# Patient Record
Sex: FEMALE | HISPANIC OR LATINO | Employment: FULL TIME | URBAN - METROPOLITAN AREA
[De-identification: names, ages, dates, MRNs, and addresses within clinical notes are randomized per-mention and may not be internally consistent; named-entity substitution may affect disease eponyms.]

---

## 2018-04-23 ENCOUNTER — OFFICE VISIT (OUTPATIENT)
Dept: URGENT CARE | Facility: CLINIC | Age: 38
End: 2018-04-23

## 2018-04-23 VITALS
HEART RATE: 80 BPM | SYSTOLIC BLOOD PRESSURE: 98 MMHG | BODY MASS INDEX: 21.6 KG/M2 | TEMPERATURE: 99 F | OXYGEN SATURATION: 99 % | DIASTOLIC BLOOD PRESSURE: 68 MMHG | HEIGHT: 60 IN | RESPIRATION RATE: 18 BRPM | WEIGHT: 110 LBS

## 2018-04-23 DIAGNOSIS — L03.129: Primary | ICD-10-CM

## 2018-04-23 PROCEDURE — 99203 OFFICE O/P NEW LOW 30 MIN: CPT | Mod: S$GLB,,, | Performed by: PHYSICIAN ASSISTANT

## 2018-04-23 RX ORDER — CLINDAMYCIN PHOSPHATE 150 MG/ML
600 INJECTION, SOLUTION INTRAVENOUS
Status: COMPLETED | OUTPATIENT
Start: 2018-04-23 | End: 2018-04-23

## 2018-04-23 RX ORDER — CLINDAMYCIN HYDROCHLORIDE 300 MG/1
300 CAPSULE ORAL 3 TIMES DAILY
Qty: 21 CAPSULE | Refills: 0 | Status: SHIPPED | OUTPATIENT
Start: 2018-04-23 | End: 2018-04-30

## 2018-04-23 RX ADMIN — CLINDAMYCIN PHOSPHATE 600 MG: 150 INJECTION, SOLUTION INTRAVENOUS at 02:04

## 2018-04-23 NOTE — LETTER
April 23, 2018      [facility]  [place] [address]  Phone: [phone]  Fax: [phone]       Patient: Millie Toure   YOB: 1980  Date of Visit: 04/23/2018    To Whom It May Concern:    Janie Toure  was at [facility] on 04/23/2018. She may return to work/school on 4/24/2018 with no restrictions. If you have any questions or concerns, or if I can be of further assistance, please do not hesitate to contact me.    Sincerely,    Karol Bang PA-C

## 2018-04-23 NOTE — PATIENT INSTRUCTIONS
Lymphangitis  The lymphatic system is a part of the immune system and helps to fight against infection and inflammation. It consists of lymph glands (lymph nodes) throughout the body. Lymph channels carry lymph fluid from parts of the body to nearby lymph glands to be filtered.  Lymphangitis is an inflammation of lymph channels. It is caused by infected lymph fluid traveling from a site of infection. Symptoms are tender red streaks on the skin near the area of infection. This condition is commonly called blood poisoning, but the blood is not involved.  The condition can become very serious if not treated.The infection is treated with antibiotics or other medicines that treat the infection. If an abscess is present, it may be drained.When the site of infection is treated, the lymphangitis goes away.  Home care  · Take all medicine to treat the infection exactly as prescribed until it is gone. Be very careful not to miss any doses. Do not stop taking the medicine until it is finished or your healthcare provider tells you to stop, even if you feel better.  · Follow your healthcare provider's instructions for taking other medicines. Ask your healthcare provider before taking any over-the-counter medicines.  · Make a warm compress by running hot water over a face cloth. Apply it to the sore area until the compress cools off. Repeat 3 times a day for the first 3 days. The heat will increase the blood flow to the area and speed the healing process. As an alternative, you can  the shower and direct the warm spray to the area.  Follow-up care  Follow up with your healthcare provider, or as directed by our staff.  When to seek medical advice  Call your healthcare provider if any of the following occur:  · Increasing areas of redness or pain at the site of infection  · Red streaks continue to grow  · Pus or fluid draining from the lymph node  · Fever of 100.4°F (38°C) or higher, or as directed by your healthcare  provider.  Date Last Reviewed: 9/25/2015  © 9857-6018 [employer]. [address] [place] [address], [place] [address]. All rights reserved. This information is not intended as a substitute for professional medical care. Always follow your healthcare professional's instructions.      Please follow up with your Primary care provider within 2-5 days if your signs and symptoms have not resolved or worsen.     If your condition worsens or fails to improve we recommend that you receive another evaluation at the emergency room immediately or contact your primary medical clinic to discuss your concerns.   You must understand that you have received an Urgent Care treatment only and that you may be released before all of your medical problems are known or treated. You, the patient, will arrange for follow up care as instructed.

## 2018-04-23 NOTE — PROGRESS NOTES
Subjective:       Patient ID: Millie Toure is a 37 y.o. female.    Vitals:  height is 5' (1.524 m) and weight is 49.9 kg (110 lb). Her temperature is 99.1 °F (37.3 °C). Her blood pressure is 98/68 and her pulse is 80. Her respiration is 18 and oxygen saturation is 99%.     Chief Complaint: Leg Pain    Patient states that she fell on [place] 04/15/2018 injuring both of her knees. Patient is not having any pain in her knees but believes the leg pain is associated with her fall.      Leg Pain    The incident occurred 3 to 5 days ago. There was no injury mechanism. The pain is present in the right leg. Quality: pulsating  The pain is at a severity of 7/10. The pain is moderate. The pain has been constant since onset. Pertinent negatives include no inability to bear weight, loss of motion, loss of sensation, muscle weakness, numbness or tingling. She reports no foreign bodies present. Nothing aggravates the symptoms. Treatments tried: Ibuprofen  The treatment provided significant relief.     Review of Systems   Constitution: Negative for chills, fever and weakness.   HENT: Negative for sore throat.    Eyes: Negative for blurred vision.   Cardiovascular: Positive for leg swelling. Negative for chest pain.   Respiratory: Negative for shortness of breath.    Skin: Positive for rash.   Musculoskeletal: Negative for back pain and joint pain.   Gastrointestinal: Negative for abdominal pain, diarrhea, nausea and vomiting.   Neurological: Negative for headaches, numbness and tingling.   Psychiatric/Behavioral: The patient is not nervous/anxious.        Objective:      Physical Exam   Constitutional: She is oriented to person, place, and time. She appears well-developed and well-nourished. She is cooperative.  Non-toxic appearance. She does not appear ill. No distress.   HENT:   Head: Normocephalic and atraumatic.   Right Ear: Hearing, tympanic membrane, external ear and ear canal normal.   Left Ear: Hearing, tympanic membrane,  external ear and ear canal normal.   Nose: Nose normal. No mucosal edema, rhinorrhea or nasal deformity. No epistaxis. Right sinus exhibits no maxillary sinus tenderness and no frontal sinus tenderness. Left sinus exhibits no maxillary sinus tenderness and no frontal sinus tenderness.   Mouth/Throat: Uvula is midline, oropharynx is clear and moist and mucous membranes are normal. No trismus in the jaw. Normal dentition. No uvula swelling. No posterior oropharyngeal erythema.   Eyes: Conjunctivae and lids are normal. Right eye exhibits no discharge. Left eye exhibits no discharge. No scleral icterus.   Sclera clear bilat   Neck: Trachea normal, normal range of motion, full passive range of motion without pain and phonation normal. Neck supple.   Cardiovascular: Normal rate, regular rhythm, normal heart sounds, intact distal pulses and normal pulses.    Pulmonary/Chest: Effort normal and breath sounds normal. No respiratory distress.   Abdominal: Soft. Normal appearance and bowel sounds are normal. She exhibits no distension, no pulsatile midline mass and no mass. There is no tenderness.   Musculoskeletal: Normal range of motion. She exhibits no edema or deformity.        Right hip: She exhibits normal range of motion, normal strength, no tenderness, no bony tenderness, no swelling, no crepitus, no deformity and no laceration.        Right knee: She exhibits normal range of motion, no swelling, no effusion, no ecchymosis, no deformity, no laceration, no erythema, normal alignment, no LCL laxity, normal patellar mobility, no bony tenderness, normal meniscus and no MCL laxity. Tenderness found. No medial joint line, no lateral joint line, no MCL, no LCL and no patellar tendon tenderness noted.        Right ankle: She exhibits normal range of motion, no swelling, no ecchymosis, no deformity, no laceration and normal pulse. No tenderness. Achilles tendon normal.        Legs:  Abrasion noted anteriorly with erythematous  streaking spreading proximally and Right inguinal lymphadenopathy   Lymphadenopathy:        Right: Inguinal adenopathy present.   Neurological: She is alert and oriented to person, place, and time. She exhibits normal muscle tone. Coordination normal.   Skin: Skin is warm, dry and intact. She is not diaphoretic. No pallor.   Psychiatric: She has a normal mood and affect. Her speech is normal and behavior is normal. Judgment and thought content normal. Cognition and memory are normal.   Nursing note and vitals reviewed.      Assessment:       1. Acute lymphangitis of leg, except foot        Plan:         Acute lymphangitis of leg, except foot  -     clindamycin injection 600 mg; Inject 4 mLs (600 mg total) into the muscle one time.  -     clindamycin (CLEOCIN) 300 MG capsule; Take 1 capsule (300 mg total) by mouth 3 (three) times daily.  Dispense: 21 capsule; Refill: 0    Red flags/warnings signs were discussed with patient that would warrant emergent medical attention. All patients questions answered. They verbalized understanding to the plan.       Lymphangitis  The lymphatic system is a part of the immune system and helps to fight against infection and inflammation. It consists of lymph glands (lymph nodes) throughout the body. Lymph channels carry lymph fluid from parts of the body to nearby lymph glands to be filtered.  Lymphangitis is an inflammation of lymph channels. It is caused by infected lymph fluid traveling from a site of infection. Symptoms are tender red streaks on the skin near the area of infection. This condition is commonly called blood poisoning, but the blood is not involved.  The condition can become very serious if not treated.The infection is treated with antibiotics or other medicines that treat the infection. If an abscess is present, it may be drained.When the site of infection is treated, the lymphangitis goes away.  Home care  · Take all medicine to treat the infection exactly as  prescribed until it is gone. Be very careful not to miss any doses. Do not stop taking the medicine until it is finished or your healthcare provider tells you to stop, even if you feel better.  · Follow your healthcare provider's instructions for taking other medicines. Ask your healthcare provider before taking any over-the-counter medicines.  · Make a warm compress by running hot water over a face cloth. Apply it to the sore area until the compress cools off. Repeat 3 times a day for the first 3 days. The heat will increase the blood flow to the area and speed the healing process. As an alternative, you can  the shower and direct the warm spray to the area.  Follow-up care  Follow up with your healthcare provider, or as directed by our staff.  When to seek medical advice  Call your healthcare provider if any of the following occur:  · Increasing areas of redness or pain at the site of infection  · Red streaks continue to grow  · Pus or fluid draining from the lymph node  · Fever of 100.4°F (38°C) or higher, or as directed by your healthcare provider.  Date Last Reviewed: 9/25/2015  © 1217-4111 [employer]. [address] [place] [address]. All rights reserved. This information is not intended as a substitute for professional medical care. Always follow your healthcare professional's instructions.      Please follow up with your Primary care provider within 2-5 days if your signs and symptoms have not resolved or worsen.     If your condition worsens or fails to improve we recommend that you receive another evaluation at the emergency room immediately or contact your primary medical clinic to discuss your concerns.   You must understand that you have received an Urgent Care treatment only and that you may be released before all of your medical problems are known or treated. You, the patient, will arrange for follow up care as instructed.

## 2018-04-26 ENCOUNTER — TELEPHONE (OUTPATIENT)
Dept: URGENT CARE | Facility: CLINIC | Age: 38
End: 2018-04-26

## 2018-04-26 NOTE — TELEPHONE ENCOUNTER
Patient stated that she hasn't gotten any better. I was trying to inform the patient to follow up with PCP because she is currently in a different state, when the phone hung up. I could not get the patient back on the phone.

## 2022-03-17 ENCOUNTER — APPOINTMENT (OUTPATIENT)
Age: 42
Setting detail: DERMATOLOGY
End: 2022-03-18

## 2022-03-17 DIAGNOSIS — L81.1 CHLOASMA: ICD-10-CM

## 2022-03-17 PROCEDURE — 99202 OFFICE O/P NEW SF 15 MIN: CPT

## 2022-03-17 PROCEDURE — OTHER TREATMENT REGIMEN: OTHER

## 2022-03-17 PROCEDURE — OTHER FOLLOW UP FOR NEXT VISIT: OTHER

## 2022-03-17 PROCEDURE — OTHER COUNSELING: OTHER

## 2022-03-17 ASSESSMENT — SEVERITY ASSESSMENT: SEVERITY: MODERATE, MODERATELY DARKER THAN THE SURROUNDING NORMAL SKIN

## 2022-03-17 NOTE — PROCEDURE: TREATMENT REGIMEN
Plan: Discussed Clear Lift treatment. Patient would benefit from the clear lift procedure. We will send her information regarding treatment options and cost. She can schedule for next week
Detail Level: Simple

## 2022-03-17 NOTE — HPI: DISCOLORATION (HYPERPIGMENTATION)
Is This A New Presentation, Or A Follow-Up?: Discoloration
How Severe Is It?: moderate
Additional History: Patient states that she first started noticing the discoloration in 2008.

## 2022-03-17 NOTE — PROCEDURE: FOLLOW UP FOR NEXT VISIT
Detail Level: Simple
Other Lasers/Thermal Treatments: Clear Lift
Scheduled For Follow Up In (Optional): 1 month

## 2022-03-24 ENCOUNTER — APPOINTMENT (OUTPATIENT)
Age: 42
Setting detail: DERMATOLOGY
End: 2022-04-04

## 2022-03-24 DIAGNOSIS — L81.1 CHLOASMA: ICD-10-CM

## 2022-03-24 PROCEDURE — OTHER ALMA LASER: OTHER

## 2022-03-24 PROCEDURE — OTHER FOLLOW UP FOR NEXT VISIT: OTHER

## 2022-03-24 PROCEDURE — OTHER COUNSELING: OTHER

## 2022-03-24 ASSESSMENT — LOCATION SIMPLE DESCRIPTION DERM
LOCATION SIMPLE: LEFT CHEEK
LOCATION SIMPLE: RIGHT CHEEK

## 2022-03-24 ASSESSMENT — LOCATION DETAILED DESCRIPTION DERM
LOCATION DETAILED: LEFT CENTRAL MALAR CHEEK
LOCATION DETAILED: RIGHT CENTRAL MALAR CHEEK

## 2022-03-24 ASSESSMENT — LOCATION ZONE DERM: LOCATION ZONE: FACE

## 2022-03-24 NOTE — PROCEDURE: ALMA LASER
Shr Post-Care: Post care reviewed with patient.
Treatment Number: 1
Price (Use Numbers Only, No Special Characters Or $): 262.67
Consent: Written consent obtained, risks reviewed including but not limited to crusting, scabbing, blistering, scarring, darker or lighter pigmentary change, systemic reactions, ulceration, incidental hair removal, bruising, and/or incomplete removal.
Detail Level: Detailed
Post-Care To Use?: Generic
Pulse Mode (Optional): L
Pulse Width: 30 ms
Total Pulses: Total of 5 2 sq cm areas treated on the cheeks and forehead with accumulative energy of 500J  per area. Stacked pulses across nose with accumulative energy of 100 J in this area.
Frequency In Hz: Unibody
Benyn Post-Care: Immediate endpoint whitening and pinpoint bleeding. Vaseline and ice applied. Post care reviewed with patient.
External Cooling Fan Speed: 5
Ipl Post-Care: Vaseline and ice applied. Post care reviewed with patient.
Handpiece: Bipolar
Matrix Dots: 7 x 7
Pulse Duration In Ms: 10
Energy (Mj/P): 600
Frequency In Hz: 2
Power (Marshall): Low (10w)
Fluence (Mj/Cm2): 4
Power In Marshall: 50
Handpiece: Mobile Captain Pro 5
Post-Care Instructions: I reviewed with the patient in detail post-care instructions. Patient should avoid sunlight and wear sun protection.
Clear Lift Post-Care: Post care reviewed with patient. Patient should avoid direct sunlight and wear broad spectrum sunscreen daily. Post-treatment packet with cleanser, moisturizer, sunscreen, and written post op instructions given to patient.
Energy (Optional): 1000 mJ/P
Frequency (Optional): 4 Hz
Energy In Mj: 400
Overlap (Optional): 80%
Pulse Type: I
Spot Size In Mm: 3

## 2022-04-07 ENCOUNTER — APPOINTMENT (OUTPATIENT)
Age: 42
Setting detail: DERMATOLOGY
End: 2022-04-08

## 2022-04-07 DIAGNOSIS — Z41.9 ENCOUNTER FOR PROCEDURE FOR PURPOSES OTHER THAN REMEDYING HEALTH STATE, UNSPECIFIED: ICD-10-CM

## 2022-04-07 DIAGNOSIS — L81.1 CHLOASMA: ICD-10-CM

## 2022-04-07 PROCEDURE — OTHER COUNSELING: OTHER

## 2022-04-07 PROCEDURE — OTHER PRESCRIPTION: OTHER

## 2022-04-07 PROCEDURE — OTHER FOLLOW UP FOR NEXT VISIT: OTHER

## 2022-04-07 PROCEDURE — OTHER ALMA LASER: OTHER

## 2022-04-07 PROCEDURE — OTHER COSMETIC CONSULTATION: GENERAL: OTHER

## 2022-04-07 ASSESSMENT — LOCATION DETAILED DESCRIPTION DERM
LOCATION DETAILED: LEFT CENTRAL MALAR CHEEK
LOCATION DETAILED: RIGHT CENTRAL MALAR CHEEK

## 2022-04-07 ASSESSMENT — LOCATION SIMPLE DESCRIPTION DERM
LOCATION SIMPLE: RIGHT CHEEK
LOCATION SIMPLE: LEFT CHEEK

## 2022-04-07 ASSESSMENT — LOCATION ZONE DERM: LOCATION ZONE: FACE

## 2022-04-07 NOTE — PROCEDURE: FOLLOW UP FOR NEXT VISIT
Scheduled For Follow Up In (Optional): 2-4 weeks
Instructions (Optional): Clearlift3
Detail Level: Simple

## 2022-04-07 NOTE — PROCEDURE: ALMA LASER
Treatment Number: 1
Frequency In Hz: 2
Consent: Written consent obtained, risks reviewed including but not limited to crusting, scabbing, blistering, scarring, darker or lighter pigmentary change, systemic reactions, ulceration, incidental hair removal, bruising, and/or incomplete removal.
External Cooling Fan Speed: 5
 Post-Care: Post care reviewed with patient.
Pulse Mode (Optional): L
Frequency (Optional): 4 Hz
Pulse Duration In Ms: 10
Handpiece: Bipolar
Matrix Dots: 7 x 7
Handpiece: [employer] Pro 5
Frequency In Hz: Unibody
Ipl Post-Care: Vaseline and ice applied. Post care reviewed with patient.
Post-Care Instructions: I reviewed with the patient in detail post-care instructions. Patient should avoid sunlight and wear sun protection.
Energy (Mj/P): 600
Fluence (Mj/Cm2): 4
Energy (Optional): 1000 mJ/P
Power In Marshall: 50
Detail Level: Detailed
Power (Marshall): Low (10w)
Post-Care To Use?: Generic
Pulse Width: 30 ms
Benny Post-Care: Immediate endpoint whitening and pinpoint bleeding. Vaseline and ice applied. Post care reviewed with patient.
Energy In Mj: 400
Spot Size In Mm: 3
Pulse Type: I
Clear Lift Post-Care: Total of 4 2 sq cm areas treated on the cheeks and forehead with accumulative energy of 500J  per area. Stacked pulses across nose with accumulative energy of 200 J in this area. Post care reviewed with patient. Patient should avoid direct sunlight and wear broad spectrum sunscreen daily. Post-treatment packet with cleanser, moisturizer, sunscreen, and written post op instructions given to patient.
Price (Use Numbers Only, No Special Characters Or $): 262.53

## 2022-04-21 ENCOUNTER — APPOINTMENT (OUTPATIENT)
Age: 42
Setting detail: DERMATOLOGY
End: 2022-05-07

## 2022-04-21 DIAGNOSIS — L81.1 CHLOASMA: ICD-10-CM

## 2022-04-21 PROCEDURE — OTHER FOLLOW UP FOR NEXT VISIT: OTHER

## 2022-04-21 PROCEDURE — OTHER COUNSELING: OTHER

## 2022-04-21 PROCEDURE — OTHER ALMA LASER: OTHER

## 2022-04-21 ASSESSMENT — LOCATION DETAILED DESCRIPTION DERM
LOCATION DETAILED: RIGHT INFERIOR CENTRAL MALAR CHEEK
LOCATION DETAILED: LEFT SUPERIOR CENTRAL MALAR CHEEK
LOCATION DETAILED: INFERIOR MID FOREHEAD
LOCATION DETAILED: NASAL DORSUM
LOCATION DETAILED: RIGHT MEDIAL MALAR CHEEK
LOCATION DETAILED: LEFT SUPERIOR CENTRAL BUCCAL CHEEK
LOCATION DETAILED: LEFT MEDIAL MALAR CHEEK

## 2022-04-21 ASSESSMENT — LOCATION SIMPLE DESCRIPTION DERM
LOCATION SIMPLE: NOSE
LOCATION SIMPLE: RIGHT CHEEK
LOCATION SIMPLE: INFERIOR FOREHEAD
LOCATION SIMPLE: LEFT CHEEK

## 2022-04-21 ASSESSMENT — LOCATION ZONE DERM
LOCATION ZONE: NOSE
LOCATION ZONE: FACE

## 2022-04-21 NOTE — PROCEDURE: COUNSELING
Detail Level: Simple
Patient Specific Counseling (Will Not Stick From Patient To Patient): Patient will continue to use melasma elusion 3 times per week.

## 2022-04-21 NOTE — PROCEDURE: FOLLOW UP FOR NEXT VISIT
Detail Level: Simple
Instructions (Optional): Clearlift4
Scheduled For Follow Up In (Optional): 2-4 weeks

## 2022-04-21 NOTE — PROCEDURE: ALMA LASER
Treatment Number: 1
Clear Lift Post-Care: Patient has been using melasma 0.05% compound 3 times a week and is tolerating it well.  Counseled to wait 3 days post-treatment to restart the topical.  Refer to drawing for energy levels at each of the 7 sections treated. Targeted areas with less pigment clearance on superior aspects of cheeks and on the left inferior temple with additional energy stacking reflected in total accumulated energy. Patient did have mild erythema where pulses were stacked. Procedure was well tolerated. Post care reviewed with patient. Patient should avoid direct sunlight and wear broad spectrum sunscreen daily.
Nd:Yag Post-Care: Immediate endpoint whitening and pinpoint bleeding. Vaseline and ice applied. Post care reviewed with patient.
Consent: Written consent obtained, risks reviewed including but not limited to crusting, scabbing, blistering, scarring, darker or lighter pigmentary change, systemic reactions, ulceration, incidental hair removal, bruising, and/or incomplete removal.
Pixel Post-Care: Post care reviewed with patient.
Detail Level: Detailed
Energy (Optional): 1000 mJ/P
Fluence (Mj/Cm2): 4
Spot Size In Mm: 3
Post-Care To Use?: Generic
Frequency In Hz: 2
Ipl Post-Care: Vaseline and ice applied. Post care reviewed with patient.
External Cooling Fan Speed: 5
Pulse Duration In Ms: 10
Intensity (Optional): aggressive
Pulse Mode (Optional): L
Energy (Mj/P): 600
Frequency In Hz: Unibody
Pulse Width: 30 ms
Handpiece: Bipolar
Matrix Dots: 7 x 7
Energy In Mj: 400
Post-Care Instructions: I reviewed with the patient in detail post-care instructions. Patient should avoid sunlight and wear sun protection.
Power (Marshall): Low (10w)
Energy In Kj: 50
Frequency (Optional): 4 Hz
Price (Use Numbers Only, No Special Characters Or $): 262.09
Pulse Type: I
Handpiece: [employer]oke Pro 5

## 2022-06-21 ENCOUNTER — APPOINTMENT (OUTPATIENT)
Age: 42
Setting detail: DERMATOLOGY
End: 2022-07-05

## 2022-06-21 DIAGNOSIS — L81.1 CHLOASMA: ICD-10-CM

## 2022-06-21 PROCEDURE — OTHER TREATMENT REGIMEN: OTHER

## 2022-06-21 PROCEDURE — OTHER COSMETIC CONSULTATION: Q SWITCHED LASER: OTHER

## 2022-06-21 PROCEDURE — OTHER ALMA LASER: OTHER

## 2022-06-21 PROCEDURE — OTHER FOLLOW UP FOR NEXT VISIT: OTHER

## 2022-06-21 PROCEDURE — OTHER COUNSELING: OTHER

## 2022-06-21 ASSESSMENT — LOCATION DETAILED DESCRIPTION DERM
LOCATION DETAILED: RIGHT INFERIOR CENTRAL MALAR CHEEK
LOCATION DETAILED: LEFT INFERIOR CENTRAL MALAR CHEEK
LOCATION DETAILED: NASAL DORSUM
LOCATION DETAILED: INFERIOR MID FOREHEAD

## 2022-06-21 ASSESSMENT — LOCATION ZONE DERM
LOCATION ZONE: FACE
LOCATION ZONE: NOSE

## 2022-06-21 ASSESSMENT — LOCATION SIMPLE DESCRIPTION DERM
LOCATION SIMPLE: NOSE
LOCATION SIMPLE: RIGHT CHEEK
LOCATION SIMPLE: LEFT CHEEK
LOCATION SIMPLE: INFERIOR FOREHEAD

## 2022-06-21 NOTE — PROCEDURE: FOLLOW UP FOR NEXT VISIT
Other Exam: Treatment Plan Update
Detail Level: Simple
Scheduled For Follow Up In (Optional): 2 weeks

## 2022-06-21 NOTE — PROCEDURE: TREATMENT REGIMEN
Continue Regimen: 0.05% Melasma Emulsion
Plan: Patient has been using melasma 0.05% compound 3 times a week and is tolerating it well.  Because of darkening of patches since last treatment in April, she will increase usage of topical to nightly for 2 weeks, hold for 1 week, then restart cycle.  Counseled to wait 3 days post-treatment to restart the topical.
Detail Level: Simple

## 2022-06-21 NOTE — PROCEDURE: ALMA LASER
Treatment Number: 1
Fluence (J/Cm2): 8
Treatment Fluence In Mj: 10
St Module Post-Care: Post care reviewed with patient.
Nd:Yag Post-Care: Immediate endpoint whitening and pinpoint bleeding. Vaseline and ice applied. Post care reviewed with patient.
Frequency In Hz: Unibody
Consent: Written consent obtained, risks reviewed including but not limited to crusting, scabbing, blistering, scarring, darker or lighter pigmentary change, systemic reactions, ulceration, incidental hair removal, bruising, and/or incomplete removal.
Pulse Width: 30 ms
Detail Level: Simple
Intensity (Optional): aggressive
Treatment Number: 4
Overlap (Optional): 80%
Energy In Kj: 50
Ipl Post-Care: Vaseline and ice applied. Post care reviewed with patient.
Total Energy (Kj): 3.1
Energy (Optional): 1000 mJ/P
External Cooling Fan Speed: 0
Spot Size In Mm: 3
Price (Use Numbers Only, No Special Characters Or $): 262.66
Pulse Mode (Optional): L
Post-Care To Use?: Generic
Energy (Mj/P): 600
Clear Lift Post-Care: Refer to drawing for cumulative energy at each of the 6 sections treated. Targeted areas with darker pigmentation on superior aspects of cheeks and on the left inferior temple with additional energy stacking reflected in total accumulated energy. Patient did have mild erythema where pulses were stacked. Procedure was well tolerated. Post care reviewed with patient. Patient should avoid direct sunlight and wear broad spectrum sunscreen daily.
Energy In Mj: 400
Post-Care Instructions: I reviewed with the patient in detail post-care instructions. Patient should avoid sunlight and wear sun protection.
Frequency (Optional): 4 Hz

## 2022-08-02 ENCOUNTER — APPOINTMENT (OUTPATIENT)
Age: 42
Setting detail: DERMATOLOGY
End: 2022-08-03

## 2022-08-02 ENCOUNTER — APPOINTMENT (OUTPATIENT)
Age: 42
Setting detail: DERMATOLOGY
End: 2022-08-02

## 2022-08-02 DIAGNOSIS — L81.1 CHLOASMA: ICD-10-CM

## 2022-08-02 PROCEDURE — OTHER FOLLOW UP FOR NEXT VISIT: OTHER

## 2022-08-02 PROCEDURE — OTHER TREATMENT REGIMEN: OTHER

## 2022-08-02 PROCEDURE — OTHER COUNSELING: OTHER

## 2022-08-02 PROCEDURE — OTHER PRESCRIPTION: OTHER

## 2022-08-02 ASSESSMENT — SEVERITY ASSESSMENT: SEVERITY: MODERATE, MODERATELY DARKER THAN THE SURROUNDING NORMAL SKIN

## 2022-08-02 NOTE — PROCEDURE: TREATMENT REGIMEN
Detail Level: Simple
Continue Regimen: 0.05% Melasma Emulsion
Plan: Patient will continue using the melasma compound every night as directed. If she notices any irritation or change she will take a small break as directed. Hopefully by using the topical product through the summer and into the fall she will see improvement. My recommendation is to follow up in October for reevaluation and possible repeat clear lift treatment. We did agree to either give her a portion of her money back for one treatment or to provide one additional treatment at no charge. \\n\\nWe also discussed the possibility of adding spironolactone as a possible adjunct treatment for melasma. There isn’t much literature on the topic but it does appear it may have some anecdotal benefit. We will start the patient on 50 mg once a day then if tolerated increased to 50 mg twice a day. If she has any problems she will stop the medication and call the office. We did review the potential side effects of this medication

## 2022-08-03 RX ORDER — SPIRONOLACTONE 50 MG/1
50 MG TABLET, FILM COATED ORAL BID
Qty: 60 | Refills: 3 | Status: ERX | COMMUNITY
Start: 2022-08-02

## 2022-11-10 ENCOUNTER — APPOINTMENT (OUTPATIENT)
Age: 42
Setting detail: DERMATOLOGY
End: 2022-11-10

## 2022-11-10 DIAGNOSIS — L81.1 CHLOASMA: ICD-10-CM

## 2022-11-10 PROCEDURE — OTHER COUNSELING: OTHER

## 2022-11-10 PROCEDURE — OTHER PRESCRIPTION: OTHER

## 2022-11-10 PROCEDURE — OTHER ALMA LASER: OTHER

## 2022-11-10 PROCEDURE — OTHER TREATMENT REGIMEN: OTHER

## 2022-11-10 PROCEDURE — OTHER FOLLOW UP FOR NEXT VISIT: OTHER

## 2022-11-10 PROCEDURE — OTHER COSMETIC CONSULTATION: Q SWITCHED LASER: OTHER

## 2022-11-10 RX ORDER — TRANEXAMIC ACID 650 MG/1
325 MG TABLET, FILM COATED ORAL BID
Qty: 30 | Refills: 2 | Status: ERX | COMMUNITY
Start: 2022-11-10

## 2022-11-10 ASSESSMENT — LOCATION DETAILED DESCRIPTION DERM
LOCATION DETAILED: RIGHT INFERIOR CENTRAL MALAR CHEEK
LOCATION DETAILED: NASAL DORSUM
LOCATION DETAILED: INFERIOR MID FOREHEAD
LOCATION DETAILED: LEFT INFERIOR CENTRAL MALAR CHEEK

## 2022-11-10 ASSESSMENT — LOCATION SIMPLE DESCRIPTION DERM
LOCATION SIMPLE: RIGHT CHEEK
LOCATION SIMPLE: INFERIOR FOREHEAD
LOCATION SIMPLE: LEFT CHEEK
LOCATION SIMPLE: NOSE

## 2022-11-10 ASSESSMENT — LOCATION ZONE DERM
LOCATION ZONE: NOSE
LOCATION ZONE: FACE

## 2022-11-10 ASSESSMENT — SEVERITY ASSESSMENT: SEVERITY: MODERATE, MODERATELY DARKER THAN THE SURROUNDING NORMAL SKIN

## 2022-11-10 NOTE — PROCEDURE: ALMA LASER
Treatment Number: 1
Fluence (Mj/Cm2): 4
Intensity (Optional): mild
 Post-Care: Post care reviewed with patient.
Total Energy (Kj): 3.1
Post-Care Instructions: I reviewed with the patient in detail post-care instructions. Patient should avoid sunlight and wear sun protection.
Energy In Mj: 400
Post-Care To Use?: Generic
Detail Level: Simple
Power In Marshall: 50
Pulse Width: 30 ms
Energy (Mj/P): 600
Frequency In Hz: Unibody
Fluence (J/Cm2): 8
Overlap (Optional): 80%
Treatment Fluence In Mj: 10
Clear Lift Post-Care: Refer to drawing for cumulative energy at each of the 6 sections treated. Targeted areas with darker pigmentation on superior aspects of cheeks and on the left inferior temple with additional energy stacking reflected in total accumulated energy. Patient did have mild erythema. Procedure was well tolerated. Post care reviewed with patient. Patient should avoid direct sunlight and wear broad spectrum sunscreen daily.
Consent: Written consent obtained, risks reviewed including but not limited to crusting, scabbing, blistering, scarring, darker or lighter pigmentary change, systemic reactions, ulceration, incidental hair removal, bruising, and/or incomplete removal.
Energy (Optional): 800 mJ/P
Ipl Post-Care: Vaseline and ice applied. Post care reviewed with patient.
External Cooling Fan Speed: 0
Treatment Number: 5
Nd:Yag Post-Care: Immediate endpoint whitening and pinpoint bleeding. Vaseline and ice applied. Post care reviewed with patient.
Price (Use Numbers Only, No Special Characters Or $): 0.00
Plasma Intensity (%): 25
Spot Size In Mm: 3
Frequency (Optional): 2 Hz
Pulse Mode (Optional): L

## 2022-11-10 NOTE — PROCEDURE: TREATMENT REGIMEN
Continue Regimen: 0.05% Melasma Emulsion
Plan: Patient will continue using the melasma emotion as directed. Use mineral sunscreen every day. Call her follow up if she has any problems post treatment. I did  the patient that there can be some increase redness and darkening right after the treatment. She’s also going to start tranexamic acid 325 mg BID. Follow up in two months to check progress. At that time we may switch her over to a topical tranexamic acid. I did like to keep the settings fairly mild. See photo reference for total energy and sexual description of the treatment. On the right cheek I did use the 0 tip. On the forehead and the left cheek I use the + 1 tip
Detail Level: Zone

## 2022-11-10 NOTE — PROCEDURE: COUNSELING
What Type Of Note Output Would You Prefer (Optional)?: Standard Output Hpi Title: Evaluation of Skin Lesions Additional History: Pt presents for FBSE Detail Level: Simple

## 2023-01-26 ENCOUNTER — APPOINTMENT (OUTPATIENT)
Age: 43
Setting detail: DERMATOLOGY
End: 2023-04-07

## 2023-01-26 DIAGNOSIS — L81.1 CHLOASMA: ICD-10-CM

## 2023-01-26 PROCEDURE — OTHER TREATMENT REGIMEN: OTHER

## 2023-01-26 PROCEDURE — OTHER COUNSELING: OTHER

## 2023-01-26 PROCEDURE — 99212 OFFICE O/P EST SF 10 MIN: CPT

## 2023-01-26 PROCEDURE — OTHER FOLLOW UP FOR NEXT VISIT: OTHER

## 2023-01-26 ASSESSMENT — SEVERITY ASSESSMENT: SEVERITY: MILD, SLIGHTLY DARKER THAN THE SURROUNDING NORMAL SKIN

## 2023-01-26 NOTE — PROCEDURE: FOLLOW UP FOR NEXT VISIT
Detail Level: Simple
Other Exam: Treatment Plan Update
Scheduled For Follow Up In (Optional): 2 weeks

## 2023-01-26 NOTE — PROCEDURE: TREATMENT REGIMEN
Continue Regimen: 0.05% Melasma Emulsion
Plan: Continue tranexamic acid 325 mg BID. Follow up in two months to check progress. Start topical
Detail Level: Zone

## 2023-11-20 ENCOUNTER — APPOINTMENT (OUTPATIENT)
Age: 43
Setting detail: DERMATOLOGY
End: 2023-12-04

## 2023-11-20 DIAGNOSIS — L81.1 CHLOASMA: ICD-10-CM

## 2023-11-20 PROCEDURE — OTHER FOLLOW UP FOR NEXT VISIT: OTHER

## 2023-11-20 PROCEDURE — OTHER TREATMENT REGIMEN: OTHER

## 2023-11-20 PROCEDURE — OTHER PRESCRIPTION: OTHER

## 2023-11-20 PROCEDURE — OTHER COUNSELING: OTHER

## 2023-11-20 RX ORDER — H-QUINONE/TRETINOIN/HYDROCORT 8-0.05-0.5
THIN COAT EMULSION (GRAM) TOPICAL QHS
Qty: 30 | Refills: 0 | Status: ERX | COMMUNITY
Start: 2023-11-20

## 2023-11-20 NOTE — PROCEDURE: TREATMENT REGIMEN
Detail Level: Zone
Continue Regimen: 0.05% Melasma Emulsion
Plan: Continue tranexamic acid 325 mg BID. Follow up in two months to check progress. Start topical

## 2023-11-20 NOTE — PROCEDURE: FOLLOW UP FOR NEXT VISIT
Detail Level: Simple
Other Exam: Treatment Plan Update
Scheduled For Follow Up In (Optional): 2-4 weeks